# Patient Record
Sex: MALE
[De-identification: names, ages, dates, MRNs, and addresses within clinical notes are randomized per-mention and may not be internally consistent; named-entity substitution may affect disease eponyms.]

---

## 2019-05-20 PROBLEM — Z00.00 ENCOUNTER FOR PREVENTIVE HEALTH EXAMINATION: Status: ACTIVE | Noted: 2019-05-20

## 2019-05-28 ENCOUNTER — APPOINTMENT (OUTPATIENT)
Dept: INTERNAL MEDICINE | Facility: CLINIC | Age: 49
End: 2019-05-28
Payer: COMMERCIAL

## 2019-05-28 VITALS
OXYGEN SATURATION: 97 % | BODY MASS INDEX: 26.18 KG/M2 | SYSTOLIC BLOOD PRESSURE: 140 MMHG | DIASTOLIC BLOOD PRESSURE: 70 MMHG | WEIGHT: 204 LBS | HEIGHT: 74 IN | HEART RATE: 68 BPM

## 2019-05-28 DIAGNOSIS — Z87.891 PERSONAL HISTORY OF NICOTINE DEPENDENCE: ICD-10-CM

## 2019-05-28 DIAGNOSIS — R06.00 DYSPNEA, UNSPECIFIED: ICD-10-CM

## 2019-05-28 DIAGNOSIS — Z83.438 FAMILY HISTORY OF OTHER DISORDER OF LIPOPROTEIN METABOLISM AND OTHER LIPIDEMIA: ICD-10-CM

## 2019-05-28 DIAGNOSIS — Z82.49 FAMILY HISTORY OF ISCHEMIC HEART DISEASE AND OTHER DISEASES OF THE CIRCULATORY SYSTEM: ICD-10-CM

## 2019-05-28 DIAGNOSIS — Z83.3 FAMILY HISTORY OF DIABETES MELLITUS: ICD-10-CM

## 2019-05-28 PROCEDURE — 99204 OFFICE O/P NEW MOD 45 MIN: CPT

## 2019-05-28 NOTE — ASSESSMENT
[FreeTextEntry1] : Patient with an unusual symptom of waking up gasping for air at the beginning of his sleep cycle. I cannot rule out obstructive sleep apnea syndrome although certainly this patient is not the typical patient with this entity. I doubt this is related to acid reflux disease. Patient does not think he is excessively anxious at this time. I feel we need to rule out obstructive sleep apnea syndrome with a monitored sleep study. We'll arrange for in lab sleep study

## 2019-05-28 NOTE — PHYSICAL EXAM

## 2019-05-28 NOTE — HISTORY OF PRESENT ILLNESS
[FreeTextEntry1] : Evaluation for gasping for air at night [de-identified] : This is a generally healthy 48-year-old male who states that for the past few months he's developed periods where he wakes up gasping for air about 3 times per week. This generally occurs at the start of his sleep cycle. It may happen every few minutes for up to an hour or so. He has difficulty going back to sleep when this occurs. It occurs about 3 times per week. He denies loud snoring. It is unclear if these episodes are witnessed apneas. His general sleep time is from midnight to 6:40 AM. He feels somewhat sleepy during the day and does not feel rested in the morning. He denies heartburn symptoms. He denies unusual excess anxiety at this time. He is 6 feet 2 weight 204 pounds. There is no history of alcohol use. His wife has never complained of loud snoring. His past medical history is significant for very mild intermittent asthma, history of depression in the past. He is on no significant medication. He works as an . His [place] sleepiness score is 9.